# Patient Record
Sex: MALE | Race: WHITE | URBAN - METROPOLITAN AREA
[De-identification: names, ages, dates, MRNs, and addresses within clinical notes are randomized per-mention and may not be internally consistent; named-entity substitution may affect disease eponyms.]

---

## 2020-08-14 ENCOUNTER — NEW PATIENT (OUTPATIENT)
Dept: URBAN - METROPOLITAN AREA CLINIC 51 | Facility: CLINIC | Age: 65
End: 2020-08-14
Payer: MEDICARE

## 2020-08-14 DIAGNOSIS — H43.313 VITREOUS MEMBRANES AND STRANDS, BILATERAL: ICD-10-CM

## 2020-08-14 DIAGNOSIS — H25.13 AGE-RELATED NUCLEAR CATARACT, BILATERAL: ICD-10-CM

## 2020-08-14 PROCEDURE — 92134 CPTRZ OPH DX IMG PST SGM RTA: CPT | Performed by: OPTOMETRIST

## 2020-08-14 PROCEDURE — 92004 COMPRE OPH EXAM NEW PT 1/>: CPT | Performed by: OPTOMETRIST

## 2020-08-14 ASSESSMENT — VISUAL ACUITY
OD: 20/50
OS: 20/25

## 2020-08-14 ASSESSMENT — KERATOMETRY
OD: 43.05
OS: 43.30

## 2020-08-14 ASSESSMENT — INTRAOCULAR PRESSURE
OD: 16
OS: 16

## 2020-10-21 ENCOUNTER — FOLLOW UP ESTABLISHED (OUTPATIENT)
Dept: URBAN - METROPOLITAN AREA CLINIC 51 | Facility: CLINIC | Age: 65
End: 2020-10-21
Payer: MEDICARE

## 2020-10-21 DIAGNOSIS — H50.9 UNSPECIFIED STRABISMUS: ICD-10-CM

## 2020-10-21 PROCEDURE — 92012 INTRM OPH EXAM EST PATIENT: CPT | Performed by: OPTOMETRIST

## 2020-10-21 PROCEDURE — 92083 EXTENDED VISUAL FIELD XM: CPT | Performed by: OPTOMETRIST

## 2021-11-10 ENCOUNTER — OFFICE VISIT (OUTPATIENT)
Dept: URBAN - METROPOLITAN AREA CLINIC 51 | Facility: CLINIC | Age: 66
End: 2021-11-10
Payer: MEDICARE

## 2021-11-10 DIAGNOSIS — H35.371 PUCKERING OF MACULA, RIGHT EYE: ICD-10-CM

## 2021-11-10 DIAGNOSIS — H35.361 DRUSEN (DEGENERATIVE) OF MACULA, RIGHT EYE: ICD-10-CM

## 2021-11-10 DIAGNOSIS — I72.9 ANEURYSM OF UNSPECIFIED SITE: ICD-10-CM

## 2021-11-10 DIAGNOSIS — H52.4 PRESBYOPIA: ICD-10-CM

## 2021-11-10 DIAGNOSIS — H47.011 ISCHEMIC OPTIC NEUROPATHY, RIGHT EYE: ICD-10-CM

## 2021-11-10 DIAGNOSIS — H04.123 TEAR FILM INSUFFICIENCY OF BILATERAL LACRIMAL GLANDS: ICD-10-CM

## 2021-11-10 PROCEDURE — 92014 COMPRE OPH EXAM EST PT 1/>: CPT | Performed by: OPTOMETRIST

## 2021-11-10 PROCEDURE — 92134 CPTRZ OPH DX IMG PST SGM RTA: CPT | Performed by: OPTOMETRIST

## 2021-11-10 ASSESSMENT — INTRAOCULAR PRESSURE
OD: 13
OS: 12

## 2021-11-10 ASSESSMENT — VISUAL ACUITY
OD: 20/50
OS: 20/20

## 2021-11-10 NOTE — IMPRESSION/PLAN
Impression: Drusen (degenerative) of macula, right eye Plan: Difficult examination due to poor positioning behind slit lamp. Drusen/ RPE changes OD, no SRF OU. MAC OCT taken today. RTC with any changes/worsening in vision. Monitor with MOCT/FUNDUS PHOTOS.

## 2021-11-10 NOTE — IMPRESSION/PLAN
Impression: Unspecified strabismus OD Plan: Longstanding per patient history. Per pt no diplopia. Monitor.

## 2021-11-10 NOTE — IMPRESSION/PLAN
Impression: Age-related nuclear cataract, bilateral: H25.13. Plan: Difficult examination due to poor positioning behind slit lamp. Cataracts are not visually significant or effecting ADLs/vision. No treatment is currently warranted. Patient will monitor vision closely and contact our office with any changes/ worsening in vision. Will re-evaluate on return visit.

## 2021-11-10 NOTE — IMPRESSION/PLAN
Impression: Vitreous membranes and strands, bilateral: H43.313. *PVD OD Plan: Difficult examination due to poor positioning behind slit lamp. Retina appears attached 360 degrees with no signs of any retinal holes, tears, or detachments observed on today's dilated examination. Pt to RTC ASAP if increase in floaters, flashes, or curtain or veil taking away vision.

## 2021-11-10 NOTE — IMPRESSION/PLAN
Impression: Optic neuropathy, right eye Plan: HVF performed today at last appointment on 10/21/2020. OD: superior hemifield depression. Monitor with RNFL + 24-2 HVF.

## 2021-11-10 NOTE — IMPRESSION/PLAN
Impression: Puckering of macula, right eye Plan: Difficult examination due to poor positioning behind slit lamp. ERM w/ retinal thickening, no CME. MAC OCT taken today. ERM appears to be effecting/ limiting patients vision at this time. RTC if notice changes in vision. Monitor with MAC OCT.

## 2021-11-10 NOTE — IMPRESSION/PLAN
Impression: Aneurysm; Temp lobe Plan: [facility] (10/21/2020) OD: superior hemifield depression; OS: S/N quadranopsia. Monitor.

## 2023-09-28 ENCOUNTER — OFFICE VISIT (OUTPATIENT)
Dept: URBAN - METROPOLITAN AREA CLINIC 51 | Facility: CLINIC | Age: 68
End: 2023-09-28
Payer: MEDICARE

## 2023-09-28 DIAGNOSIS — H47.011 ISCHEMIC OPTIC NEUROPATHY, RIGHT EYE: ICD-10-CM

## 2023-09-28 DIAGNOSIS — H35.371 PUCKERING OF MACULA, RIGHT EYE: ICD-10-CM

## 2023-09-28 DIAGNOSIS — H50.9 UNSPECIFIED STRABISMUS: ICD-10-CM

## 2023-09-28 DIAGNOSIS — H25.13 AGE-RELATED NUCLEAR CATARACT, BILATERAL: Primary | ICD-10-CM

## 2023-09-28 DIAGNOSIS — H43.313 VITREOUS MEMBRANES AND STRANDS, BILATERAL: ICD-10-CM

## 2023-09-28 DIAGNOSIS — H52.4 PRESBYOPIA: ICD-10-CM

## 2023-09-28 DIAGNOSIS — I72.9 ANEURYSM OF UNSPECIFIED SITE: ICD-10-CM

## 2023-09-28 DIAGNOSIS — H35.361 DRUSEN (DEGENERATIVE) OF MACULA, RIGHT EYE: ICD-10-CM

## 2023-09-28 PROCEDURE — 92134 CPTRZ OPH DX IMG PST SGM RTA: CPT | Performed by: OPTOMETRIST

## 2023-09-28 PROCEDURE — 99214 OFFICE O/P EST MOD 30 MIN: CPT | Performed by: OPTOMETRIST

## 2023-09-28 ASSESSMENT — KERATOMETRY
OD: 42.88
OS: 43.25

## 2023-09-28 ASSESSMENT — INTRAOCULAR PRESSURE
OS: 14
OD: 14

## 2023-09-28 ASSESSMENT — VISUAL ACUITY
OS: 20/20
OD: 20/50